# Patient Record
Sex: FEMALE | Race: WHITE | Employment: UNEMPLOYED | URBAN - METROPOLITAN AREA
[De-identification: names, ages, dates, MRNs, and addresses within clinical notes are randomized per-mention and may not be internally consistent; named-entity substitution may affect disease eponyms.]

---

## 2017-04-12 ENCOUNTER — MYC MEDICAL ADVICE (OUTPATIENT)
Dept: FAMILY MEDICINE | Facility: OTHER | Age: 22
End: 2017-04-12

## 2017-05-26 ENCOUNTER — HEALTH MAINTENANCE LETTER (OUTPATIENT)
Age: 22
End: 2017-05-26

## 2018-07-05 NOTE — PROGRESS NOTES
SUBJECTIVE:   Krysten Hsu is a 22 year old female who presents to clinic today for the following health issues:      HPI     Rash  Onset: x 1 month    Description:   Location: Face  Character: blotchy, red  Itching (Pruritis): no     Progression of Symptoms:  worsening    Accompanying Signs & Symptoms:  Fever: no   Body aches or joint pain: no   Sore throat symptoms: YES  Recent cold symptoms: no     History:   Previous similar rash: YES    Precipitating factors:   Exposure to similar rash: no   New exposures: None   Recent travel: no   Therapies Tried and outcome: dual power moisture lotion & aveeno clear complexion cleanse        Patient would also like to get her PAP completed today      Problem list and histories reviewed & adjusted, as indicated.  Additional history: as documented        Patient Active Problem List   Diagnosis     Headache     Dysmenorrhea     Acne vulgaris     Abdominal pain, unspecified abdominal location     Anxiety     Mixed anxiety depressive disorder     Past Surgical History:   Procedure Laterality Date     GALLBLADDER SURGERY  03/06/2014     LAPAROSCOPIC CHOLECYSTECTOMY  3/6/2014    Procedure: LAPAROSCOPIC CHOLECYSTECTOMY;  Laparoscopic Cholecystectomy;  Surgeon: Fabio Mullins MD;  Location:  OR       Social History   Substance Use Topics     Smoking status: Never Smoker     Smokeless tobacco: Never Used      Comment: no smokers     Alcohol use No     Family History   Problem Relation Age of Onset     Circulatory Maternal Grandmother      Blood clots     Breast Cancer Maternal Grandmother      precancerous cells     Blood Disease Paternal Grandmother      Respiratory Paternal Grandmother      Pnuemonia     Cerebrovascular Disease Paternal Grandmother      Respiratory Paternal Grandfather      Emphesema     Prostate Cancer Paternal Grandfather      Cancer Paternal Grandfather      Bone Cancer         Current Outpatient Prescriptions   Medication Sig Dispense Refill      amitriptyline (ELAVIL) 25 MG tablet Take 1 tablet (25 mg) by mouth At Bedtime       Cholecalciferol (VITAMIN D3 PO) Take 2,000 Units by mouth 2 times daily        Diclofenac Potassium 50 MG TABS Take 50 mg by mouth daily       doxycycline (VIBRAMYCIN) 100 MG capsule Take 1 capsule (100 mg) by mouth 2 times daily for 10 days 20 capsule 0     Omega-3 Fatty Acids (OMEGA-3 FISH OIL PO)        Prenatal Vit-Fe Fumarate-FA (PRENATAL VITAMIN PO)        propranolol (INDERAL) 80 MG tablet Take 80 mg by mouth daily       SUMAtriptan (IMITREX) 100 MG tablet Take 100 mg by mouth as needed       benzoyl peroxide 4 % GEL        clindamycin (CLEOCIN T) 1 % lotion Apply a think layer to the face, chest and spots on the trunk and extremities. (Patient not taking: Reported on 7/9/2018) 60 mL 11     levonorgestrel (MIRENA) 20 MCG/24HR IUD 1 each (20 mcg) by Intrauterine route once for 1 dose NDC 06462-572-59  Lot [id number]  Exp 04/17 1 each 0     PARoxetine (PAXIL) 20 MG tablet Take 1 tablet (20 mg) by mouth At Bedtime       sertraline (ZOLOFT) 50 MG tablet Take 1 tablet (50 mg) by mouth daily (Patient not taking: Reported on 7/9/2018) 30 tablet 1     tretinoin (RETIN-A) 0.05 % cream Spread a pea size amount into affected area on the face topically at bedtime.  Use sunscreen SPF>30. (Patient not taking: Reported on 7/9/2018) 45 g 11     Zaleplon (SONATA PO)        Allergies   Allergen Reactions     No Known Drug Allergies      BP Readings from Last 3 Encounters:   07/09/18 116/78   06/29/16 120/80   12/23/15 94/64    Wt Readings from Last 3 Encounters:   07/09/18 139 lb (63 kg)   06/29/16 130 lb 6.4 oz (59.1 kg)   12/23/15 123 lb 14.4 oz (56.2 kg)                  Labs reviewed in EPIC    ROS:  Constitutional, HEENT, cardiovascular, pulmonary, gi and gu systems are negative, except as otherwise noted.    OBJECTIVE:     /78 (BP Location: Right arm, Patient Position: Chair, Cuff Size: Adult Regular)  Pulse 77  Temp 98.2  F  "(36.8  C) (Temporal)  Resp 16  Ht 5' 5\" (1.651 m)  Wt 139 lb (63 kg)  SpO2 100%  BMI 23.13 kg/m2  Body mass index is 23.13 kg/(m^2).   Physical Exam   Constitutional: She appears well-developed and well-nourished.   HENT:   Head: Normocephalic and atraumatic.   Genitourinary: Vaginal discharge found.   Skin:   Acne vulgaris   Psychiatric: She has a normal mood and affect.         Diagnostic Test Results:  none     ASSESSMENT/PLAN:     Problem List Items Addressed This Visit     Acne vulgaris - Primary    Relevant Medications    doxycycline (VIBRAMYCIN) 100 MG capsule      Other Visit Diagnoses     Screening for malignant neoplasm of cervix        Relevant Orders    Pap imaged thin layer screen only - recommended age 21 - 24 years (Completed)         Acne with superimposed infection. Trial doxycycline   Pap completed today  Sophie Gonsalves MD  [facility]  "

## 2018-07-09 ENCOUNTER — OFFICE VISIT (OUTPATIENT)
Dept: FAMILY MEDICINE | Facility: OTHER | Age: 23
End: 2018-07-09
Payer: COMMERCIAL

## 2018-07-09 VITALS
RESPIRATION RATE: 16 BRPM | HEIGHT: 65 IN | TEMPERATURE: 98.2 F | DIASTOLIC BLOOD PRESSURE: 78 MMHG | SYSTOLIC BLOOD PRESSURE: 116 MMHG | WEIGHT: 139 LBS | HEART RATE: 77 BPM | BODY MASS INDEX: 23.16 KG/M2 | OXYGEN SATURATION: 100 %

## 2018-07-09 DIAGNOSIS — L70.0 ACNE VULGARIS: Primary | ICD-10-CM

## 2018-07-09 DIAGNOSIS — Z12.4 SCREENING FOR MALIGNANT NEOPLASM OF CERVIX: ICD-10-CM

## 2018-07-09 PROCEDURE — G0145 SCR C/V CYTO,THINLAYER,RESCR: HCPCS | Performed by: FAMILY MEDICINE

## 2018-07-09 PROCEDURE — 99213 OFFICE O/P EST LOW 20 MIN: CPT | Performed by: FAMILY MEDICINE

## 2018-07-09 RX ORDER — DOXYCYCLINE 100 MG/1
100 CAPSULE ORAL 2 TIMES DAILY
Qty: 20 CAPSULE | Refills: 0 | Status: SHIPPED | OUTPATIENT
Start: 2018-07-09 | End: 2018-07-10

## 2018-07-09 RX ORDER — SUMATRIPTAN 100 MG/1
100 TABLET, FILM COATED ORAL PRN
COMMUNITY

## 2018-07-09 RX ORDER — PROPRANOLOL HYDROCHLORIDE 80 MG/1
80 TABLET ORAL DAILY
COMMUNITY

## 2018-07-09 RX ORDER — DICLOFENAC POTASSIUM 50 MG/1
50 TABLET, FILM COATED ORAL DAILY
COMMUNITY
End: 2019-06-10

## 2018-07-09 ASSESSMENT — PAIN SCALES - GENERAL: PAINLEVEL: NO PAIN (0)

## 2018-07-09 NOTE — LETTER
My Depression Action Plan  Name: Krysten Hsu   Date of Birth 1995  Date: 7/5/2018    My doctor: Alex Willoughby   My clinic: [address]  [facility] [address] [phone]          GREEN    ZONE   Good Control    What it looks like:     Things are going generally well. You have normal up s and down s. You may even feel depressed from time to time, but bad moods usually last less than a day.   What you need to do:  1. Continue to care for yourself (see self care plan)  2. Check your depression survival kit and update it as needed  3. Follow your physician s recommendations including any medication.  4. Do not stop taking medication unless you consult with your physician first.           YELLOW         ZONE Getting Worse    What it looks like:     Depression is starting to interfere with your life.     It may be hard to get out of bed; you may be starting to isolate yourself from others.    Symptoms of depression are starting to last most all day and this has happened for several days.     You may have suicidal thoughts but they are not constant.   What you need to do:     1. Call your care team, your response to treatment will improve if you keep your care team informed of your progress. Yellow periods are signs an adjustment may need to be made.     2. Continue your self-care, even if you have to fake it!    3. Talk to someone in your support network    4. Open up your depression survival kit           RED    ZONE Medical Alert - Get Help    What it looks like:     Depression is seriously interfering with your life.     You may experience these or other symptoms: You can t get out of bed most days, can t work or engage in other necessary activities, you have trouble taking care of basic hygiene, or basic responsibilities, thoughts of suicide or death that will not go away, self-injurious behavior.     What you need to do:  1. Call your  care team and request a same-day appointment. If they are not available (weekends or after hours) call your local crisis line, emergency room or 911.            Depression Self Care Plan / Survival Kit    Self-Care for Depression  Here s the deal. Your body and mind are really not as separate as most people think.  What you do and think affects how you feel and how you feel influences what you do and think. This means if you do things that people who feel good do, it will help you feel better.  Sometimes this is all it takes.  There is also a place for medication and therapy depending on how severe your depression is, so be sure to consult with your medical provider and/ or Behavioral Health Consultant if your symptoms are worsening or not improving.     In order to better manage my stress, I will:    Exercise  Get some form of exercise, every day. This will help reduce pain and release endorphins, the  feel good  chemicals in your brain. This is almost as good as taking antidepressants!  This is not the same as joining a gym and then never going! (they count on that by the way ) It can be as simple as just going for a walk or doing some gardening, anything that will get you moving.      Hygiene   Maintain good hygiene (Get out of bed in the morning, Make your bed, Brush your teeth, Take a shower, and Get dressed like you were going to work, even if you are unemployed).  If your clothes don't fit try to get ones that do.    Diet  I will strive to eat foods that are good for me, drink plenty of water, and avoid excessive sugar, caffeine, alcohol, and other mood-altering substances.  Some foods that are helpful in depression are: complex carbohydrates, B vitamins, flaxseed, fish or fish oil, fresh fruits and vegetables.    Psychotherapy  I agree to participate in Individual Therapy (if recommended).    Medication  If prescribed medications, I agree to take them.  Missing doses can result in serious side effects.  I  understand that drinking alcohol, or other illicit drug use, may cause potential side effects.  I will not stop my medication abruptly without first discussing it with my provider.    Staying Connected With Others  I will stay in touch with my friends, family members, and my primary care provider/team.    Use your imagination  Be creative.  We all have a creative side; it doesn t matter if it s oil painting, sand castles, or mud pies! This will also kick up the endorphins.    Witness Beauty  (AKA stop and smell the roses) Take a look outside, even in mid-winter. Notice colors, textures. Watch the squirrels and birds.     Service to others  Be of service to others.  There is always someone else in need.  By helping others we can  get out of ourselves  and remember the really important things.  This also provides opportunities for practicing all the other parts of the program.    Humor  Laugh and be silly!  Adjust your TV habits for less news and crime-drama and more comedy.    Control your stress  Try breathing deep, massage therapy, biofeedback, and meditation. Find time to relax each day.     My support system    Clinic Contact:  Phone number:    Contact 1:  Phone number:    Contact 2:  Phone number:    Anabaptism/:  Phone number:    Therapist:  Phone number:    Local crisis center:    Phone number:    Other community support:  Phone number:

## 2018-07-09 NOTE — MR AVS SNAPSHOT
After Visit Summary   7/9/2018    Krysten Hsu    MRN: [id number]           Patient Information     Date Of Birth          1995        Visit Information        Provider Department      7/9/2018 11:40 AM Sophie Gonsalves MD [facility]        Today's Diagnoses     Acne vulgaris    -  1    Screening examination for venereal disease        Screening for malignant neoplasm of cervix        Screening for HIV (human immunodeficiency virus)        Need for prophylactic vaccination with tetanus-diphtheria (TD)           Follow-ups after your visit        Who to contact     If you have questions or need follow up information about today's clinic visit or your schedule please contact [facility] directly at [phone].  Normal or non-critical lab and imaging results will be communicated to you by MyChart, letter or phone within 4 business days after the clinic has received the results. If you do not hear from us within 7 days, please contact the clinic through [employer] or phone. If you have a critical or abnormal lab result, we will notify you by phone as soon as possible.  Submit refill requests through [employer] or call your pharmacy and they will forward the refill request to us. Please allow 3 business days for your refill to be completed.          Additional Information About Your Visit        MyChart Information     [employer] gives you secure access to your electronic health record. If you see a primary care provider, you can also send messages to your care team and make appointments. If you have questions, please call your primary care clinic.  If you do not have a primary care provider, please call [phone] and they will assist you.        Care EveryWhere ID     This is your Care EveryWhere ID. This could be used by other organizations to access your [facility] medical records  [id number]        Your Vitals Were     Pulse Temperature Respirations Height  "Pulse Oximetry BMI (Body Mass Index)    77 98.2  F (36.8  C) (Temporal) 16 5' 5\" (1.651 m) 100% 23.13 kg/m2       Blood Pressure from Last 3 Encounters:   07/09/18 116/78   06/29/16 120/80   12/23/15 94/64    Weight from Last 3 Encounters:   07/09/18 139 lb (63 kg)   06/29/16 130 lb 6.4 oz (59.1 kg)   12/23/15 123 lb 14.4 oz (56.2 kg)              We Performed the Following     DEPRESSION ACTION PLAN (DAP)     Pap imaged thin layer screen only - recommended age 21 - 24 years          Today's Medication Changes          These changes are accurate as of 7/9/18 12:17 PM.  If you have any questions, ask your nurse or doctor.               Start taking these medicines.        Dose/Directions    doxycycline 100 MG capsule   Commonly known as:  VIBRAMYCIN   Used for:  Acne vulgaris   Started by:  Sophie Gonsalves MD        Dose:  100 mg   Take 1 capsule (100 mg) by mouth 2 times daily for 10 days   Quantity:  20 capsule   Refills:  0            Where to get your medicines      These medications were sent to [facility] [address] [place] [id number] [address] [address]     Phone:  [phone]     doxycycline 100 MG capsule                Primary Care Provider Office Phone # Fax #    Alex Arcadio Willoughby [phone] [address] [facility] [address]        Equal Access to Services     [facility] AH: Hadii marissa pierceo Somatty, waaxda luqadaha, qaybta kaalmada tristanyada, mitali berg adepatrica ordonez. So [facility] [phone].    ATENCIÓN: Si habla español, tiene a ruff disposición servicios gratuitos de asistencia lingüística. Llame al [phone].    We comply with applicable federal civil rights laws and [place] laws. We do not discriminate on the basis of race, color, national origin, age, disability, sex, sexual orientation, or gender identity.            Thank you!     Thank you for choosing [facility]  for your care. Our goal is " always to provide you with excellent care. Hearing back from our patients is one way we can continue to improve our services. Please take a few minutes to complete the written survey that you may receive in the mail after your visit with us. Thank you!             Your Updated Medication List - Protect others around you: Learn how to safely use, store and throw away your medicines at www.disposemymeds.org.          This list is accurate as of 7/9/18 12:17 PM.  Always use your most recent med list.                   Brand Name Dispense Instructions for use Diagnosis    amitriptyline 25 MG tablet    ELAVIL     Take 1 tablet (25 mg) by mouth At Bedtime        benzoyl peroxide 4 % Gel           clindamycin 1 % lotion    CLEOCIN T    60 mL    Apply a think layer to the face, chest and spots on the trunk and extremities.    Acne vulgaris       Diclofenac Potassium 50 MG Tabs      Take 50 mg by mouth daily        doxycycline 100 MG capsule    VIBRAMYCIN    20 capsule    Take 1 capsule (100 mg) by mouth 2 times daily for 10 days    Acne vulgaris       levonorgestrel 20 MCG/24HR IUD    MIRENA    1 each    1 each (20 mcg) by Intrauterine route once for 1 dose NDC 13078-474-61 Lot [id number] Exp 04/17    Encounter for IUD insertion       OMEGA-3 FISH OIL PO           PARoxetine 20 MG tablet    PAXIL     Take 1 tablet (20 mg) by mouth At Bedtime    Mixed anxiety depressive disorder       PRENATAL VITAMIN PO           propranolol 80 MG tablet    INDERAL     Take 80 mg by mouth daily        sertraline 50 MG tablet    ZOLOFT    30 tablet    Take 1 tablet (50 mg) by mouth daily    Anxiety, Mixed anxiety depressive disorder       SONATA PO           SUMAtriptan 100 MG tablet    IMITREX     Take 100 mg by mouth as needed        tretinoin 0.05 % cream    RETIN-A    45 g    Spread a pea size amount into affected area on the face topically at bedtime.  Use sunscreen SPF>30.    Acne vulgaris       VITAMIN D3 PO      Take 2,000 Units by  mouth 2 times daily

## 2018-07-10 ENCOUNTER — TELEPHONE (OUTPATIENT)
Dept: FAMILY MEDICINE | Facility: OTHER | Age: 23
End: 2018-07-10

## 2018-07-10 DIAGNOSIS — L70.0 ACNE VULGARIS: Primary | ICD-10-CM

## 2018-07-10 RX ORDER — MINOCYCLINE HYDROCHLORIDE 100 MG/1
100 CAPSULE ORAL 2 TIMES DAILY
Qty: 60 CAPSULE | Refills: 2 | Status: SHIPPED | OUTPATIENT
Start: 2018-07-10 | End: 2018-07-12

## 2018-07-11 ENCOUNTER — TELEPHONE (OUTPATIENT)
Dept: FAMILY MEDICINE | Facility: OTHER | Age: 23
End: 2018-07-11

## 2018-07-11 ENCOUNTER — OFFICE VISIT (OUTPATIENT)
Dept: URGENT CARE | Facility: RETAIL CLINIC | Age: 23
End: 2018-07-11
Payer: COMMERCIAL

## 2018-07-11 VITALS — TEMPERATURE: 97.8 F | HEART RATE: 90 BPM | DIASTOLIC BLOOD PRESSURE: 66 MMHG | SYSTOLIC BLOOD PRESSURE: 100 MMHG

## 2018-07-11 DIAGNOSIS — J02.9 ACUTE PHARYNGITIS, UNSPECIFIED ETIOLOGY: Primary | ICD-10-CM

## 2018-07-11 DIAGNOSIS — L70.0 ACNE VULGARIS: Primary | ICD-10-CM

## 2018-07-11 DIAGNOSIS — L08.9 LOCAL INFECTION OF SKIN AND SUBCUTANEOUS TISSUE: ICD-10-CM

## 2018-07-11 LAB
COPATH REPORT: NORMAL
PAP: NORMAL
S PYO AG THROAT QL IA.RAPID: NEGATIVE

## 2018-07-11 PROCEDURE — 87081 CULTURE SCREEN ONLY: CPT | Performed by: PHYSICIAN ASSISTANT

## 2018-07-11 PROCEDURE — 99203 OFFICE O/P NEW LOW 30 MIN: CPT | Performed by: PHYSICIAN ASSISTANT

## 2018-07-11 PROCEDURE — 87880 STREP A ASSAY W/OPTIC: CPT | Mod: QW | Performed by: PHYSICIAN ASSISTANT

## 2018-07-11 NOTE — PATIENT INSTRUCTIONS
Rapid strep test today is negative.   Your throat culture is pending. [facility] will call you if positive results to start antibiotics at that time.  No phone call if strep culture is negative  Symptomatic treat with fluids, rest, salt water gargles, lozenges, and over the counter pain reliever, such as tylenol or ibuprofen as needed.   Please follow up with primary care provider if not improving, worsening or new symptoms

## 2018-07-11 NOTE — PROGRESS NOTES
Chief Complaint   Patient presents with     Pharyngitis     x 4-5 days, pt not sure if feverish, chills         SUBJECTIVE:  Krysten Hsu is a 22 year old female with a chief complaint of sore throat.  Onset of symptoms was 4 day(s) ago.    Course of illness: gradual onset and still present.  Severity mild  Current and Associated symptoms:sore throat  Treatment measures tried include tea, ibuprofen  Predisposing factors include None.  PMH: acne    No past medical history on file.  Current Outpatient Prescriptions   Medication Sig Dispense Refill     amitriptyline (ELAVIL) 25 MG tablet Take 1 tablet (25 mg) by mouth At Bedtime       benzoyl peroxide 4 % GEL        Cholecalciferol (VITAMIN D3 PO) Take 2,000 Units by mouth 2 times daily        IBUPROFEN PO        levonorgestrel (MIRENA) 20 MCG/24HR IUD 1 each (20 mcg) by Intrauterine route once for 1 dose NDC 23117-909-22  Lot [id number]  Exp 04/17 1 each 0     Omega-3 Fatty Acids (OMEGA-3 FISH OIL PO)        Prenatal Vit-Fe Fumarate-FA (PRENATAL VITAMIN PO)        propranolol (INDERAL) 80 MG tablet Take 80 mg by mouth daily       SUMAtriptan (IMITREX) 100 MG tablet Take 100 mg by mouth as needed       clindamycin (CLEOCIN T) 1 % lotion Apply a think layer to the face, chest and spots on the trunk and extremities. (Patient not taking: Reported on 7/11/2018) 60 mL 11     Diclofenac Potassium 50 MG TABS Take 50 mg by mouth daily       minocycline (MINOCIN/DYNACIN) 100 MG capsule Take 1 capsule (100 mg) by mouth 2 times daily (Patient not taking: Reported on 7/11/2018) 60 capsule 2     tretinoin (RETIN-A) 0.05 % cream Spread a pea size amount into affected area on the face topically at bedtime.  Use sunscreen SPF>30. (Patient not taking: Reported on 7/11/2018) 45 g 11        Allergies   Allergen Reactions     No Known Drug Allergies         History   Smoking Status     Never Smoker   Smokeless Tobacco     Never Used     Comment: no smokers        ROS:  CONSTITUTIONAL:NEGATIVE  for chills and fever   ENT/MOUTH: POSITIVE for sore throat and NEGATIVE for ear pain bilateral and nasal congestion  RESP:NEGATIVE for significant cough or wheezing    OBJECTIVE:   /66 (BP Location: Left arm)  Pulse 90  Temp 97.8  F (36.6  C) (Tympanic)  GENERAL APPEARANCE: healthy, alert and no distress  EYES: conjunctiva clear  HENT: ear canals and TM's normal.  Nose normal.  Pharynx erythematous with no exudate noted.  NECK: supple, non-tender to palpation, no adenopathy noted  RESP: lungs clear to auscultation - no rales, rhonchi or wheezes  CV: regular rates and rhythm, normal S1 S2, no murmur noted  SKIN: no suspicious lesions or rashes    Rapid Strep test is negative; await throat culture results.    ASSESSMENT:  Acute pharyngitis, unspecified etiology    PLAN:   Rapid strep test today is negative.   Your throat culture is pending. [facility] will call you if positive results to start antibiotics at that time.  No phone call if strep culture is negative  Symptomatic treat with fluids, rest, salt water gargles, lozenges, and over the counter pain reliever, such as tylenol or ibuprofen as needed.   Please follow up with primary care provider if not improving, worsening or new symptoms     Elizabeth Hinton PA-C  [facility]

## 2018-07-11 NOTE — TELEPHONE ENCOUNTER
Reason for Call:  Medication or medication refill:    Do you use a [facility]?  Name of the pharmacy and phone number for the current request:  [facility] - [phone]    Name of the medication requested: keflex    Other request: pt states the minicycline is too expensive and she would like the keflex called in for her.  She sees the dermatologist on 7/30.    Can we leave a detailed message on this number? YES    Phone number patient can be reached at: Cell number on file:    Telephone Information:   Mobile [phone]       Best Time: any    Call taken on 7/11/2018 at 10:21 AM by Carie Brantley

## 2018-07-11 NOTE — MR AVS SNAPSHOT
After Visit Summary   7/11/2018    Krysten Hsu    MRN: [id number]           Patient Information     Date Of Birth          1995        Visit Information        Provider Department      7/11/2018 12:00 PM Elizabeth Hinton PA-C [facility]        Today's Diagnoses     Acute pharyngitis, unspecified etiology    -  1      Care Instructions    Rapid strep test today is negative.   Your throat culture is pending. [facility] will call you if positive results to start antibiotics at that time.  No phone call if strep culture is negative  Symptomatic treat with fluids, rest, salt water gargles, lozenges, and over the counter pain reliever, such as tylenol or ibuprofen as needed.   Please follow up with primary care provider if not improving, worsening or new symptoms           Follow-ups after your visit        Who to contact     You can reach your care team any time of the day by calling [phone].  Notification of test results:  If you have an abnormal lab result, we will notify you by phone as soon as possible.         Additional Information About Your Visit        MyChart Information     [employer] gives you secure access to your electronic health record. If you see a primary care provider, you can also send messages to your care team and make appointments. If you have questions, please call your primary care clinic.  If you do not have a primary care provider, please call [phone] and they will assist you.        Care EveryWhere ID     This is your Care EveryWhere ID. This could be used by other organizations to access your [place] medical records  [id number]        Your Vitals Were     Pulse Temperature                90 97.8  F (36.6  C) (Tympanic)           Blood Pressure from Last 3 Encounters:   07/11/18 100/66   07/09/18 116/78   06/29/16 120/80    Weight from Last 3 Encounters:   07/09/18 139 lb (63 kg)   06/29/16 130 lb 6.4 oz (59.1 kg)   12/23/15  123 lb 14.4 oz (56.2 kg)              We Performed the Following     BETA STREP GROUP A R/O CULTURE     RAPID STREP SCREEN        Primary Care Provider Office Phone # Fax #    Alex Willoughby [phone] [address] [place] [address]        Equal Access to Services     [facility]: Hadii aad ku hadasho Soomaali, waaxda luqadaha, qaybta kaalmada adeegyada, waxay idiin hayaan adeeg ethan la'aan ah. So [facility] [phone].    ATENCIÓN: Si habla español, tiene a ruff disposición servicios gratuitos de asistencia lingüística. [facility] [phone].    We comply with applicable federal civil rights laws and [place] laws. We do not discriminate on the basis of race, color, national origin, age, disability, sex, sexual orientation, or gender identity.            Thank you!     Thank you for choosing [facility]  for your care. Our goal is always to provide you with excellent care. Hearing back from our patients is one way we can continue to improve our services. Please take a few minutes to complete the written survey that you may receive in the mail after your visit with us. Thank you!             Your Updated Medication List - Protect others around you: Learn how to safely use, store and throw away your medicines at www.disposemymeds.org.          This list is accurate as of 7/11/18 12:28 PM.  Always use your most recent med list.                   Brand Name Dispense Instructions for use Diagnosis    amitriptyline 25 MG tablet    ELAVIL     Take 1 tablet (25 mg) by mouth At Bedtime        benzoyl peroxide 4 % Gel           clindamycin 1 % lotion    CLEOCIN T    60 mL    Apply a think layer to the face, chest and spots on the trunk and extremities.    Acne vulgaris       Diclofenac Potassium 50 MG Tabs      Take 50 mg by mouth daily        IBUPROFEN PO           levonorgestrel 20 MCG/24HR IUD    MIRENA    1 each    1 each (20 mcg) by Intrauterine route once for 1  dose NDC 09331-207-44 Lot [id number] Exp 04/17    Encounter for IUD insertion       minocycline 100 MG capsule    MINOCIN/DYNACIN    60 capsule    Take 1 capsule (100 mg) by mouth 2 times daily    Acne vulgaris       OMEGA-3 FISH OIL PO           PRENATAL VITAMIN PO           propranolol 80 MG tablet    INDERAL     Take 80 mg by mouth daily        SUMAtriptan 100 MG tablet    IMITREX     Take 100 mg by mouth as needed        tretinoin 0.05 % cream    RETIN-A    45 g    Spread a pea size amount into affected area on the face topically at bedtime.  Use sunscreen SPF>30.    Acne vulgaris       VITAMIN D3 PO      Take 2,000 Units by mouth 2 times daily

## 2018-07-12 RX ORDER — CEPHALEXIN 500 MG/1
500 CAPSULE ORAL 3 TIMES DAILY
Qty: 30 CAPSULE | Refills: 0 | Status: SHIPPED | OUTPATIENT
Start: 2018-07-12 | End: 2019-06-10

## 2018-07-13 LAB
BACTERIA SPEC CULT: NORMAL
SPECIMEN SOURCE: NORMAL

## 2018-09-13 ENCOUNTER — MYC MEDICAL ADVICE (OUTPATIENT)
Dept: FAMILY MEDICINE | Facility: OTHER | Age: 23
End: 2018-09-13

## 2018-09-17 NOTE — TELEPHONE ENCOUNTER
Patient returned call and was given message below, patient states she will look at her my chart.    Thank you Kelly

## 2018-09-17 NOTE — TELEPHONE ENCOUNTER
Patient has not read [facility] [place]. Left message for pt to return call, when call is returned ask her if she was able to figure out how to print this off in [employer] or if we need to print this and mail to her or have her pick this up.    Marcie Pichardo CMA ([facility])

## 2018-09-25 ENCOUNTER — MYC MEDICAL ADVICE (OUTPATIENT)
Dept: FAMILY MEDICINE | Facility: OTHER | Age: 23
End: 2018-09-25

## 2018-09-27 ENCOUNTER — TELEPHONE (OUTPATIENT)
Dept: FAMILY MEDICINE | Facility: OTHER | Age: 23
End: 2018-09-27

## 2018-09-27 NOTE — TELEPHONE ENCOUNTER
Reason for Call:  Form, our goal is to have forms completed with 72 hours, however, some forms may require a visit or additional information.    Type of letter, form or note:  medical    Who is the form from?: [facility] (if other please explain)    Where did the form come from: form was faxed in    What clinic location was the form placed at?: [facility] - [phone]    Where the form was placed: 's Box    What number is listed as a contact on the form?: n/a       Additional comments: Please fax to [phone]    Call taken on 9/27/2018 at 9:50 AM by Kelly Madrigal

## 2018-09-27 NOTE — TELEPHONE ENCOUNTER
Patient calling to check on form. Patient is needing this by Friday so if this can be completed asap that would be perfect per patient or if another provider can complete that would work for her too    Floridalma Robles  Reception/ Scheduling

## 2018-09-28 NOTE — TELEPHONE ENCOUNTER
Left message for patient to call back. Please relay information below and help get scheduled for physical.  Elodia CRANE CMA ([facility])

## 2018-09-28 NOTE — TELEPHONE ENCOUNTER
Noted, closing encounter, form sent to shredding.  Elodia CRANE CMA ([facility])

## 2018-11-14 DIAGNOSIS — L70.0 ACNE VULGARIS: ICD-10-CM

## 2018-11-14 RX ORDER — MINOCYCLINE HYDROCHLORIDE 100 MG/1
CAPSULE ORAL
Qty: 60 CAPSULE | Refills: 2 | Status: SHIPPED | OUTPATIENT
Start: 2018-11-14

## 2018-11-14 NOTE — TELEPHONE ENCOUNTER
"Requested Prescriptions   Pending Prescriptions Disp Refills     minocycline (MINOCIN/DYNACIN) 100 MG capsule [Pharmacy Med Name: MINOCYCLINE 100MG   CAP] 60 capsule 2     Sig: TAKE 1 CAPSULE BY MOUTH TWICE DAILY    Oral Acne/Rosacea Medications Protocol Failed    11/14/2018  1:08 PM       Failed - Confirmation of diagnosis is required    Please confirm diagnosis is acne or rosacea.     If NOT acne or rosacea; refer request to provider for further evaluation.    If diagnosis IS acne or rosacea, OK to refill BASED ON PREVIOUS REFILL CLINICAL NOTE RECOMMENDATION.         Passed - Patient is 12 years of age or older       Passed - Recent (12 mo) or future (30 days) visit within the authorizing provider's specialty    Patient had office visit in the last 12 months or has a visit in the next 30 days with authorizing provider or within the authorizing provider's specialty.  See \"Patient Info\" tab in inbasket, or \"Choose Columns\" in Meds & Orders section of the refill encounter.             Passed - No active pregnancy on record       Passed - No positive prenancy test is past 12 months        minocycline (MINOCIN/DYNACIN) 100 MG capsule (Discontinued)  Routing refill request to provider for review/approval because:  Drug not active on patient's medication list  Discontinued 07/12/2018    Holly Camarena RN, BSN           "

## 2018-11-14 NOTE — TELEPHONE ENCOUNTER
"Patient last saw RK on 07/2018 for acne and BC was refilled. She is \"just needing one more month to get her through to her appointment to where she moved to.\" Please review/advise.Rochelle De Santiago CMA    "

## 2019-06-10 ENCOUNTER — OFFICE VISIT (OUTPATIENT)
Dept: URGENT CARE | Facility: RETAIL CLINIC | Age: 24
End: 2019-06-10
Payer: COMMERCIAL

## 2019-06-10 VITALS
HEART RATE: 91 BPM | DIASTOLIC BLOOD PRESSURE: 67 MMHG | OXYGEN SATURATION: 96 % | TEMPERATURE: 98.8 F | SYSTOLIC BLOOD PRESSURE: 113 MMHG

## 2019-06-10 DIAGNOSIS — R05.9 COUGH: ICD-10-CM

## 2019-06-10 DIAGNOSIS — R07.0 THROAT PAIN: ICD-10-CM

## 2019-06-10 DIAGNOSIS — J06.9 VIRAL UPPER RESPIRATORY INFECTION: Primary | ICD-10-CM

## 2019-06-10 PROCEDURE — 99213 OFFICE O/P EST LOW 20 MIN: CPT | Performed by: PHYSICIAN ASSISTANT

## 2019-06-10 RX ORDER — MINOCYCLINE HYDROCHLORIDE 100 MG/1
CAPSULE ORAL
COMMUNITY
Start: 2018-11-14 | End: 2019-06-10

## 2019-06-10 RX ORDER — KETOROLAC TROMETHAMINE 10 MG/1
10 TABLET, FILM COATED ORAL
COMMUNITY

## 2019-06-10 RX ORDER — PROPRANOLOL HYDROCHLORIDE 80 MG/1
80 TABLET ORAL
COMMUNITY
Start: 2019-01-16 | End: 2019-06-10

## 2019-06-10 RX ORDER — CEPHALEXIN 500 MG/1
500 CAPSULE ORAL
COMMUNITY
Start: 2019-06-03 | End: 2019-06-13

## 2019-06-10 RX ORDER — LIDOCAINE HYDROCHLORIDE 20 MG/ML
SOLUTION OROPHARYNGEAL
Qty: 100 ML | Refills: 0 | Status: SHIPPED | OUTPATIENT
Start: 2019-06-10

## 2019-06-10 NOTE — PROGRESS NOTES
Chief Complaint   Patient presents with     Throat Problem     4 days; hurts in throat when coughing; gargling with salt water; recently finished amoxicillin for strep; abx have caused yeast infections.     Cough     4 days; using dayquil, nyquil     Headache     feels like face is being squished     Nasal Congestion     runny      SUBJECTIVE:   Krysten Hsu is a 23 year old female presenting with a chief complaint of cough, sore throat and congestion  Onset of symptoms was 4 day(s)  Course of illness is same.    Severity moderate  Current and Associated symptoms: sore throat, cough, congestion, headaches  Treatment measures tried include dayquil, nyquil, ibuprofen, gargling with salt water  Predisposing factors include Was taking amoxicillin for strep throat, dxed on 5/17/19 per care everywhere   Currently on Keflex 500mg TID since 6/3/19 for folliculitis - 10 day course    No past medical history on file.  Current Outpatient Medications   Medication Sig Dispense Refill     amitriptyline (ELAVIL) 25 MG tablet Take 25 mg by mouth       benzoyl peroxide 4 % GEL        cephALEXin (KEFLEX) 500 MG capsule Take 500 mg by mouth       clindamycin (CLEOCIN T) 1 % lotion Apply a think layer to the face, chest and spots on the trunk and extremities. 60 mL 11     diclofenac (VOLTAREN) 50 MG EC tablet Take 50 mg by mouth       ibuprofen (ADVIL) 100 MG tablet        ketorolac (TORADOL) 10 MG tablet Take 10 mg by mouth       levonorgestrel (MIRENA) 20 MCG/24HR IUD 1 each (20 mcg) by Intrauterine route once for 1 dose NDC 26371-821-66  Lot [id number]  Exp 04/17 1 each 0     levonorgestrel (MIRENA, 52 MG,) 20 MCG/24HR IUD 1 Intra Uterine Device by Intrauterine route       lidocaine HCl (XYLOCAINE) 2 % solution Mix with water at home. Mix approx 5 mL of lidocaine with 5 mL of water, Gargle and spit every 2-3 hrs as needed for throat pain 100 mL 0     minocycline (MINOCIN/DYNACIN) 100 MG capsule TAKE 1 CAPSULE BY MOUTH TWICE  DAILY 60 capsule 2     Omega-3 Fatty Acids (OMEGA-3 FISH OIL PO)        Probiotic Product (PROBIOTIC-10 PO)        propranolol (INDERAL) 80 MG tablet Take 80 mg by mouth daily       SUMAtriptan (IMITREX) 100 MG tablet Take 100 mg by mouth as needed       tretinoin (RETIN-A) 0.05 % cream Spread a pea size amount into affected area on the face topically at bedtime.  Use sunscreen SPF>30. 45 g 11        Allergies   Allergen Reactions     Gluten Meal Diarrhea     Lac Bovis Diarrhea     Lactose Diarrhea     Stomach flu   Stomach flu        No Known Drug Allergies         Social History     Tobacco Use     Smoking status: Never Smoker     Smokeless tobacco: Never Used     Tobacco comment: no smokers   Substance Use Topics     Alcohol use: No       ROS:  CONSTITUTIONAL:POSITIVE headache NEGATIVE  for fever or body aches  ENT/MOUTH: POSITIVE for sore throat and congestion NEG ear pain  RESP:POSITIVE for cough and NEGATIVE for wheezing    OBJECTIVE:  /67 (BP Location: Left arm)   Pulse 96   Temp 98.8  F (37.1  C) (Oral)   GENERAL APPEARANCE: healthy, alert and no distress  EYES: conjunctiva clear  HENT: ear canals and TM's normal.  Nose scant rhinorrhea. mouth without ulcers, erythema or lesions. PND present   NECK: supple, nontender, no lymphadenopathy  RESP: lungs clear to auscultation - no rales, rhonchi or wheezes  CV: regular rates and rhythm, normal S1 S2, no murmur noted  SKIN: no suspicious lesions or rashes    ASSESSMENT:  (R07.0) Throat pain  (J06.9) Viral upper respiratory infection  (R05) Cough    PLAN:  Plan: lidocaine HCl (XYLOCAINE) 2 % solution    Patient Instructions   Viral respiratory chest colds can last for several weeks    For throat pain  Use lidocaine as needed  Drink plenty of fluids and rest.  If you notice itchy throat, itchy eyes, sneezing and runny nose, you may have allergies.  May use salt water gargles- about 8 oz warm water with about 1 teaspoon salt  Sucrets and Cepacol spray are  "over the counter medications that numb the throat.  Over the counter pain relievers such as tylenol or ibuprofen may be used as needed.   Honey lemon tea helps to soothe the throat. \"Throat Coat\" tea is soothing as well.    For cough  No indication for antibiotics discussed.    Rest! Your body needs more rest to heal.  Drink plenty of fluids (warm fluids like tea or soup are soothing and reduce cough)  Sit in the bathroom with a hot shower running and breathe in the steam.  Honey may soothe your sore throat and help manage your cough- may take straight or in warm water with lemon juice.  Avoid smoke from cigarettes, fireplace, bonfire etc.  Take Tylenol or an NSAID such as ibuprofen (Advil) or naproxen (Aleve) as needed for pain.  OR Mucinex DM (guiafenesin/dextromethorphan) thin mucus and may help it to loosen more quickly  Good handwashing is the best way to prevent spread of the common cold.  Present to emergency room if you develop trouble breathing, swallowing or cough-up blood.  Follow up with your primary care provider if symptoms worsen or fail to improve as expected    For congestion  At pharmacy counter, ask for generic sudafed/pseudophedrine red tablets - take 1 -2 tablets for nasal congestion as needed  Steam treatment  Warm compresses   Saline nasal spray  Please follow up with primary care provider if not improving, worsening     Elizabeth Hinton PA-C  [facility]   "

## 2019-06-10 NOTE — PATIENT INSTRUCTIONS
"Viral respiratory chest colds can last for several weeks    For throat pain  Use lidocaine as needed  Drink plenty of fluids and rest.  If you notice itchy throat, itchy eyes, sneezing and runny nose, you may have allergies.  May use salt water gargles- about 8 oz warm water with about 1 teaspoon salt  Sucrets and Cepacol spray are over the counter medications that numb the throat.  Over the counter pain relievers such as tylenol or ibuprofen may be used as needed.   Honey lemon tea helps to soothe the throat. \"Throat Coat\" tea is soothing as well.    For cough  No indication for antibiotics discussed.    Rest! Your body needs more rest to heal.  Drink plenty of fluids (warm fluids like tea or soup are soothing and reduce cough)  Sit in the bathroom with a hot shower running and breathe in the steam.  Honey may soothe your sore throat and help manage your cough- may take straight or in warm water with lemon juice.  Avoid smoke from cigarettes, fireplace, bonfire etc.  Take Tylenol or an NSAID such as ibuprofen (Advil) or naproxen (Aleve) as needed for pain.  Delsym () is a 12 hour over the counter cough medication    OR Mucinex DM (guiafenesin/dextromethorphan) thin mucus and may help it to loosen more quickly  Good handwashing is the best way to prevent spread of the common cold.  Present to emergency room if you develop trouble breathing, swallowing or cough-up blood.  Follow up with your primary care provider if symptoms worsen or fail to improve as expected    For congestion  At pharmacy counter, ask for generic sudafed/pseudophedrine red tablets - take 1 -2 tablets for nasal congestion as needed  Steam treatment  Warm compresses   Saline nasal spray  Please follow up with primary care provider if not improving, worsening      "

## 2020-01-29 NOTE — TELEPHONE ENCOUNTER
Dr. Gonsalves:    Patient requesting Minocycline as discussed in clinic yesterday as Doxycycline is too expensive.    Les Yeh, RN, BSN    
Reason for Call:  Medication or medication refill:    Do you use a [facility]?  Name of the pharmacy and phone number for the current request:  [facility] - [phone]    Name of the medication requested: minicycline for staff inf on face    Other request: none    Can we leave a detailed message on this number? YES    Phone number patient can be reached at: Home number on file [phone] (home)    Best Time: any    Call taken on 7/10/2018 at 10:38 AM by Carie Brantley      
Turkish